# Patient Record
Sex: FEMALE | Race: ASIAN
[De-identification: names, ages, dates, MRNs, and addresses within clinical notes are randomized per-mention and may not be internally consistent; named-entity substitution may affect disease eponyms.]

---

## 2018-01-03 ENCOUNTER — HOSPITAL ENCOUNTER (OUTPATIENT)
Age: 63
Discharge: HOME | End: 2018-01-03

## 2018-01-03 ENCOUNTER — HOSPITAL ENCOUNTER (OUTPATIENT)
Dept: HOSPITAL 91 - RAD | Age: 63
Discharge: HOME | End: 2018-01-03
Payer: COMMERCIAL

## 2018-01-03 DIAGNOSIS — R05: Primary | ICD-10-CM

## 2018-01-03 PROCEDURE — 71046 X-RAY EXAM CHEST 2 VIEWS: CPT

## 2019-01-25 ENCOUNTER — HOSPITAL ENCOUNTER (EMERGENCY)
Dept: HOSPITAL 91 - E/R | Age: 64
Discharge: HOME | End: 2019-01-25
Payer: COMMERCIAL

## 2019-01-25 ENCOUNTER — HOSPITAL ENCOUNTER (EMERGENCY)
Dept: HOSPITAL 10 - E/R | Age: 64
Discharge: HOME | End: 2019-01-25
Payer: COMMERCIAL

## 2019-01-25 VITALS
WEIGHT: 174.39 LBS | BODY MASS INDEX: 34.24 KG/M2 | BODY MASS INDEX: 34.24 KG/M2 | HEIGHT: 60 IN | HEIGHT: 60 IN | WEIGHT: 174.39 LBS

## 2019-01-25 VITALS — HEART RATE: 72 BPM | SYSTOLIC BLOOD PRESSURE: 155 MMHG | DIASTOLIC BLOOD PRESSURE: 82 MMHG | RESPIRATION RATE: 20 BRPM
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
## 2019-01-25 DIAGNOSIS — R51: ICD-10-CM
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
## 2019-01-25 DIAGNOSIS — R40.2412: ICD-10-CM

## 2019-01-25 DIAGNOSIS — S19.9XXA: ICD-10-CM

## 2019-01-25 DIAGNOSIS — S00.81XA: Primary | ICD-10-CM

## 2019-01-25 DIAGNOSIS — E03.9: ICD-10-CM

## 2019-01-25 PROCEDURE — 70450 CT HEAD/BRAIN W/O DYE: CPT

## 2019-01-25 PROCEDURE — 72125 CT NECK SPINE W/O DYE: CPT

## 2019-01-25 PROCEDURE — 99284 EMERGENCY DEPT VISIT MOD MDM: CPT

## 2019-01-25 NOTE — ERD
ER Documentation


Chief Complaint


Chief Complaint





head lac head and neck pain s/p fall onto concrete 01/24at 1800 no KO





HPI


63-year-old female complaining of headache and neck pain after a fall yesterday.


 Patient states she tripped and fell at home, and hit her forehead on the 


concrete floor.  She has sustained a skin wound on her forehead.  States that he


has now stopped bleeding.  Denies LOC or vomiting after the fall.  Patient has 


history of thyroid disease, but denies any other past medical history.  Patient 


is not anticoagulant or antiplatelet medications.





ROS


All systems reviewed and are negative except as per history of present illness.





Medications


Home Meds


Active Scripts


Acetaminophen* (Tylophen*) 500 Mg Capsule, 1 CAP PO Q6H PRN for PAIN AND OR 


ELEVATED TEMP, #20 CAP


   Prov:AVINASH SHORE NP         1/25/19


Reported Medications


Levothyroxine Sodium* (Synthroid*) 25 Mcg Tablet, 25 MCG PO DAILY


   7/17/12





Allergies


Allergies:  


Coded Allergies:  


     Penicillins (Verified  Allergy, 7/17/12)


     Sulfa(Sulfonamide Antibiotics) (Verified  Allergy, 7/17/12)





PMhx/Soc


History of Surgery:  No


Anesthesia Reaction:  No


Hx Neurological Disorder:  No


Hx Respiratory Disorders:  No


Hx Cardiac Disorders:  No


Hx Psychiatric Problems:  No


Hx Miscellaneous Medical Probl:  Yes (HYPOTHYROID )


Hx Alcohol Use:  No


Hx Substance Use:  No


Hx Tobacco Use:  No





Physical Exam


Vitals





Vital Signs


  Date      Temp  Pulse  Resp  B/P (MAP)   Pulse Ox  O2          O2 Flow    FiO2


Time                                                 Delivery    Rate


   1/25/19  98.1     84    20      190/89        97


     19:47                          (122)





Physical Exam


General:    Patient is well-developed.  Awake, alert, and conversant, in no 


apparent distress


Skin:    Warm and dry


Head:    Normocephalic, epidermal skin abrasion on the forehead with oozing 


blood.  Without palpable skull deformities


Eyes:    Pupils equal, round, and reactive to light.  Extraocular movements 


intact.  No periorbital ecchymosis or step-off


Ears:    Canals patent.  Tympanic membranes are clear.  No rodriguez sign.  No 


hemotympanum


Nose/face:    Atraumatic.  Facial bones are nontender to palpation and stable 


with attempts at manipulation


Neck:    Midline tenderness around C4-C5 levels, no, step-off, or deformity to 


firm palpation of posterior cervical spine.  Trachea midline.  Carotids equal.  


No masses.  No JVD.  Full range of motion of the neck without limitation or pain


Chest:    No surface trauma.  Nontender without crepitus or deformity.  No 


palpable subcutaneous air.  Lungs have good tidal volume, lungs clear to 


auscultate bilaterally


Heart:    Regular rate and rhythm.  No murmur, rub, or gallop


Extremities:    No surface trauma.  Full range of motion without limitation or 


pain.  Good strength in all extremities.  Sensation to light touch intact.  All 


peripheral pulses are intact and equal


Neuro:    Alert and oriented 4, GCS 15, cranial nerves II through XII intact.  


Motor and sensory exam is nonfocal.  Reflexes are symmetric


Results 24 hrs


PROCEDURE:   CT Brain without contrast. 


 


CLINICAL INDICATION:    Trauma 


 


TECHNIQUE:   A CT of the brain was performed on a multidetector CT scanner 


utilizing axial imaging from the skull base through the vertex without IV 


contrast.  Multiplanar reformatted images were made.  Images were reviewed on a 


PACS workstation.  The CTDIvol is 40 mGy and the DLP is 634 mGycm.  


 


DICOM images are available.


One or more of the following dose reduction techniques were utilized:


1.) Automated exposure control


2.) Adjustment of the mA +/- kV according to patient's size


3.) Use of iterative reconstruction technique.


 


COMPARISON:   None 


 


FINDINGS:


There is no intracranial hemorrhage, mass effect, or midline shift.  No extra-


axial fluid collection is seen. There is mild to moderate age appropriate 


diffuse cerebral volume loss with sulcal and ventricular dilatation. Ventricles 


are in the midline and of normal configuration.. The density of the brain is 


normal, and the gray white matter differentiation appears well-preserved.  The 


visualized paranasal sinuses and osseous structures are grossly unremarkable. No


skull fracture is visualized. There is frontal scalp swelling.


 


IMPRESSION:


 


Frontal scalp swelling. No intracranial hemorrhage or skull fracture.


 


Mild age-appropriate atrophy. No intracranial mass or evidence of acute 


transcortical infarct.


_____________________________________________ 


.Gabriel Verdin MD, MD           Date    Time 


Electronically viewed and signed by .Gabriel Verdin MD, MD on 01/25/2019 


22:02 


 


D:  01/25/2019 22:02  T:  01/25/2019 22:02


.A/





CC: AVINASH SHORE NP





PROCEDURE:   CT Cervical Spine without contrast. 


 


CLINICAL INDICATION:   Trauma


 


TECHNIQUE:   A CT of the cervical spine was performed on a multidetector CT 


scanner utilizing thin section axial images from the skull base through the 


thoracic inlet.  Sagittal and coronal reformatted images were made.  The CTDIvol


is 22 mGy and the DLP is 419 mGycm.


 


DICOM images are available.


One or more of the following dose reduction techniques were utilized:


1.) Automated exposure control


2.) Adjustment of the mA +/- kV according to patient's size


3.) Use of iterative reconstruction technique.


 


COMPARISON:   No prior studies are available for comparison. 


 


FINDINGS:


There is loss of normal lordosis with kyphotic deformity centered on C4. No 


step-off or prevertebral soft tissue swelling is seen. Vertebral bodies have 


normal height. There is no fracture. There is mild to moderate narrowing of C3-


C4 and C4-C5 with moderate narrowing of C5-C6 and C6-C7 discs. There is a left 


paracentral posterior ridging at C3-C4 measuring 3 mm in AP diameter with 


compromise of the neural foramen. There is concentric disc ridge at C4-C5 


measuring 3 mm in AP diameter. No central stenosis is present. There is 


bilateral foraminal stenosis. There is concentric disc ridge at C5-C6 measuring 


3 mm in AP diameter with bilateral foraminal stenosis. There is right C6-C7 


foraminal stenosis. Pedicles and posterior elements are intact with no fracture.


 


 


 


IMPRESSION:


 


No fracture or step-off.


 


Loss of normal lordosis with kyphosis cervical spine compatible with muscular 


spasm or sprain.


 


Multilevel degenerative disc disease. Note disc herniation. Multilevel ridging 


with bilateral foraminal stenosis as detailed above.


_____________________________________________ 


.Gabriel Verdin MD, MD           Date    Time 


Electronically viewed and signed by .Gabriel Verdin MD, MD on 01/25/2019 


22:05 


 


D:  01/25/2019 22:05  T:  01/25/2019 22:05


.A/





CC: AVINASH SHORE NP





Procedures/MDM


Well-appearing 63-year-old female presented to ED after fall and head injury.  


CT of the brain and C-spine is obtained.  No sign of intracranial hemorrhage, or


acute C-spine fracture or subluxation is noted.  Degenerative changes of the C-


spine is noted CT.





Patient also sustained a skin abrasion from the fall.  The wound is not suitable


for suturing, Surgicel dressing is applied to control the bleeding.  





 Patient appears well, stable for discharge and outpatient management. Medical 


decision making shared with patient and family. Education provided to patient 


and family. Patient and family expressed understanding of the plan.





Medications on discharge: Tylenol.


Follow-up: Primary care provider in 2-3 days or return to ED if worse.





Disclaimer: Inadvertent spelling and grammatical errors are likely due to 


EHR/dictation software use and do not reflect on the overall quality of patient 


care. Also, please note that the electronic time recorded on this note does not 


necessarily reflect the actual time of the patient encounter.





Departure


Diagnosis:  


   Primary Impression:  


   Fall with no significant injury


   Encounter type:  initial encounter  Qualified Codes:  W19.XXXA - Unspecified 


   fall, initial encounter


   Additional Impressions:  


   Abrasion


   Neck pain


Condition:  Stable











AVINASH SOHRE NP               Jan 25, 2019 21:49